# Patient Record
Sex: MALE | Race: OTHER | HISPANIC OR LATINO | URBAN - METROPOLITAN AREA
[De-identification: names, ages, dates, MRNs, and addresses within clinical notes are randomized per-mention and may not be internally consistent; named-entity substitution may affect disease eponyms.]

---

## 2024-02-07 ENCOUNTER — EMERGENCY (EMERGENCY)
Facility: HOSPITAL | Age: 29
LOS: 1 days | Discharge: HOME | End: 2024-02-07
Attending: EMERGENCY MEDICINE | Admitting: EMERGENCY MEDICINE
Payer: SELF-PAY

## 2024-02-07 VITALS
DIASTOLIC BLOOD PRESSURE: 73 MMHG | HEART RATE: 70 BPM | SYSTOLIC BLOOD PRESSURE: 121 MMHG | OXYGEN SATURATION: 97 % | RESPIRATION RATE: 17 BRPM | HEIGHT: 71 IN | WEIGHT: 210.1 LBS | TEMPERATURE: 98 F

## 2024-02-07 VITALS
RESPIRATION RATE: 18 BRPM | SYSTOLIC BLOOD PRESSURE: 153 MMHG | HEART RATE: 70 BPM | OXYGEN SATURATION: 97 % | TEMPERATURE: 98 F | DIASTOLIC BLOOD PRESSURE: 91 MMHG

## 2024-02-07 DIAGNOSIS — S00.12XA CONTUSION OF LEFT EYELID AND PERIOCULAR AREA, INITIAL ENCOUNTER: ICD-10-CM

## 2024-02-07 DIAGNOSIS — R42 DIZZINESS AND GIDDINESS: ICD-10-CM
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
## 2024-02-07 DIAGNOSIS — R51.9 HEADACHE, UNSPECIFIED: ICD-10-CM

## 2024-02-07 DIAGNOSIS — J45.909 UNSPECIFIED ASTHMA, UNCOMPLICATED: ICD-10-CM

## 2024-02-07 PROCEDURE — 99284 EMERGENCY DEPT VISIT MOD MDM: CPT

## 2024-02-07 PROCEDURE — 70486 CT MAXILLOFACIAL W/O DYE: CPT | Mod: 26,MA

## 2024-02-07 PROCEDURE — 70486 CT MAXILLOFACIAL W/O DYE: CPT | Mod: MA

## 2024-02-07 PROCEDURE — 99284 EMERGENCY DEPT VISIT MOD MDM: CPT | Mod: 25

## 2024-02-07 NOTE — ED PROVIDER NOTE - NSFOLLOWUPINSTRUCTIONS_ED_ALL_ED_FT
Please continue to ice area, Can take tylenol 650mg AND/OR motrin 600mg every 6hrs as needed for pain.  Stay well hydrated.  Follow up with primary doctor within 1-2 days.  Follow up with neurologist. Can call [phone] to schedule appointment.   Return to ER for persistent fever/vomit, uncontrolled pain, focal weakness/numbness.    Post-Concussion Syndrome    A concussion is a brain injury from a direct hit (blow) to your head or body. This blow causes your brain to shake quickly back and forth inside your skull. This can damage brain cells and cause chemical changes in your brain. Concussions are usually not life-threatening but can cause several serious symptoms.    Post-concussion syndrome is when symptoms that occur after a concussion last longer than normal. These symptoms can last from weeks to months.    What are the causes?  The cause of this condition is not known. It can happen whether your head injury was mild or severe.    What increases the risk?  You are more likely to develop this condition if:  You are female.  You are a child, teen, or young adult.  You had a past head injury.  You have a history of headaches.  You have depression or anxiety.    What are the signs or symptoms?    Physical symptoms   Headaches.  Tiredness.  Dizziness.  Weakness.  Blurry vision.  Sensitivity to light.  Hearing difficulties.  Mental and emotional symptoms     Memory difficulties.  Difficulty with concentration.  Difficulty sleeping or staying asleep.  Feeling irritable.  Anxiety or depression.  Difficulty learning new things.    How is this diagnosed?  This condition may be diagnosed based on:  Your symptoms.  A description of your injury.  Your medical history.  Your health care provider may order other tests such as:  Brain function tests (neurological testing).  CT scan.    How is this treated?  Treatment for this condition may depend on your symptoms. Symptoms usually go away on their own over time. Treatments may include:  Medicines for headaches.  Resting your brain and body for a few days after your injury.  Rehabilitation therapy, such as:  Physical or occupational therapy. This may include exercises to help with balance and dizziness.  Mental health counseling.  Speech therapy.  Vision therapy. A brain and eye specialist can recommend treatments for vision problems.    Follow these instructions at home:  Medicines     Take over-the-counter and prescription medicines only as told by your health care provider.  Avoid opioid prescription pain medicines when recovering from a concussion.  Activity     Limit your mental activities for the first few days after your injury, such as:  Homework or job-related work.  Complex thinking.  Watching TV, and using a computer or phone.  Playing memory games and puzzles.  Gradually return to your normal activity level. If a certain activity brings on your symptoms, stop or slow down until you can do the activity without it triggering your symptoms.  Limit physical activity, such as exercise or sports, for the first few days after a concussion. Gradually return to normal activity as told by your health care provider.  If a certain activity brings on your symptoms, stop or slow down until you can do the activity without it triggering your symptoms.  Rest. Rest helps your brain heal. Make sure you:  Get plenty of sleep at night. Most adults should get at least 7–9 hours of sleep each night.  Rest during the day. Take naps or rest breaks when you feel tired.  Do not do high-risk activities that could cause a second concussion, such as riding a bike or playing sports. Having another concussion before the first one has healed can be dangerous.    General instructions   Do not drink alcohol until your health care provider says you can.  Keep track of the frequency and the severity of your symptoms. Give this information to your health care provider.  Keep all follow-up visits as directed by your health care provider. This is important.  Contact a health care provider if:  Your symptoms do not improve.  You have another injury.  Get help right away if you:  Have a severe or worsening headache.  Are confused.  Have trouble staying awake.  Pass out.  Vomit.  Have weakness or numbness in any part of your body.  Have a seizure.  Have trouble speaking.  Summary  Post-concussion syndrome is when symptoms that occur after a concussion last longer than normal.  Symptoms usually go away on their own over time. Depending on your symptoms, you may need treatment, such as medicines or rehabilitation therapy.  Rest your brain and body for a few days after your injury. Gradually return to activities, as told by your health care provider.  Get plenty of sleep, and avoid alcohol and opioid pain medicines while recovering from a concussion.

## 2024-02-07 NOTE — ED ADULT NURSE NOTE - CHIEF COMPLAINT QUOTE
Pt c/o facial injury while at work- steel pipe fell 8 feet and hit L side of cheek. Pt reports wearing helmet during event, no LOC. Denies anticoagulant use, change in vision, n/v. Tetanus shot administered at [facility].

## 2024-02-07 NOTE — ED ADULT TRIAGE NOTE - CHIEF COMPLAINT QUOTE
Pt c/o facial injury while at work- steel pipe fell 8 feet and hit L side of cheek. Pt reports wearing helmet during event, no LOC. Denies anticoagulant use, change in vision, n/v. Tetanus shot administered at [facility].

## 2024-02-07 NOTE — ED PROVIDER NOTE - CLINICAL SUMMARY MEDICAL DECISION MAKING FREE TEXT BOX
28yoM with no PMH presenting to the ED after a steel pipe hit the L side of his face at work. No LOC. No n/v, HA, or blurry vision. He did not take any pain medications and is not on any blood thinners. VSS and exam notable for bruising under L eye but no focal neuro deficits or vision changes. Will obtain CT head/orbit to evaluate for fracture. 28yoM with no PMH presenting to the ED after a steel pipe hit the L side of his face at work. No LOC. No n/v, HA, or blurry vision. He did not take any pain medications and is not on any blood thinners. VSS and exam notable for bruising under L eye but no focal neuro deficits or vision changes. Will obtain CT maxillofacial to evaluate for fracture. Not currently requesting pain medications.

## 2024-02-07 NOTE — ED PROVIDER NOTE - PATIENT PORTAL LINK FT
You can access the FollowMyHealth Patient Portal offered by [facility] by registering at the following website: [email or web address]. By joining [employer]’s FollowMyHealth portal, you will also be able to view your health information using other applications (apps) compatible with our system.

## 2024-02-07 NOTE — ED ADULT NURSE NOTE - CHIEF COMPLAINT
Detail Level: Detailed Quality 337: Tuberculosis Prevention For Psoriasis And Psoriatic Arthritis Patients On A Biological Immune Response Modifier: Patient has a documented negative annual TB screening. Quality 410: Psoriasis Clinical Response To Oral Systemic Or Biologic Medications: Psoriasis Assessment Tool Documented, Met Specified Benchmark The patient is a 28y Male complaining of facial pain.

## 2024-02-07 NOTE — ED PROVIDER NOTE - DIFFERENTIAL DIAGNOSIS
Differential Diagnosis Highest on differential is concussion after steel pipe hit patient's L side of face at work as a . Do not miss diagnosis includes fracture of orbit, will obtain CT orbit/head to evaluate. Not on blood thinners. Highest on differential is concussion after steel pipe hit patient's L side of face at work as a . Do not miss diagnosis includes fracture of orbit, will obtain CT maxillofacial to evaluate. Not on blood thinners.

## 2024-02-07 NOTE — ED PROVIDER NOTE - OBJECTIVE STATEMENT
28yoM with no PMH presenting to the ED after a steel pipe hit the L side of his face at work. He works as an  and today at work a steel pipe fell from 8 feet and hit his face. After he hit his face, he was stunned and shocked but no LOC. No n/v, HA, or blurry vision. He did not take any pain medications and is not on any blood thinners.

## 2024-02-07 NOTE — ED PROVIDER NOTE - PHYSICAL EXAMINATION
HEENT: bruising under L eye, EOMI, PERRL, no laceration  Neuro: no focal deficit General: alert, oriented, talking without difficulty, no acute distress  HEENT: bruising under L eye, EOMI, PERRL, no lacerations, no blood behind TM  Neuro: no focal deficit

## 2024-02-07 NOTE — ED ADULT NURSE NOTE - OBJECTIVE STATEMENT
Pt A&Ox4 presents to ED with complaints of left facial injury/pain. Pt reports "a 40 pound steel pipe fell 8 feet and hit me in the face at work." Pt reports "I felt out of it, but did not lose consciousness." Pt went to [facility] who administered Tetanus shot to him and referred him to ED for CT. Pt has visible swelling and bruising to left cheek. PERRL. Full strength and sensation in all four extremities. Pt denies changes in vision, numbness/tingling, nausea/vomiting. Ice pack provided. Denies use of blood thinners.

## 2024-02-07 NOTE — ED PROVIDER NOTE - NS ED ATTENDING STATEMENT MOD
I have seen and examined this patient and fully participated in the care of this patient as the teaching attending.  The service was shared with the [facility].  I reviewed and verified the documentation.

## 2024-02-07 NOTE — ED PROVIDER NOTE - NS ED ROS FT
Pain on L side of face.   Dizziness after pole hit face.     No nausea, vomiting, HA, blurry vision, pain with eye movement

## 2024-02-07 NOTE — ED PROVIDER NOTE - ATTENDING CONTRIBUTION TO CARE
29 yo PMHx of asthma with L sided facial pain after steel pipe from 8 feet high hit pt on L side of face, no loc, felt stunned. Dizzy but no vomiting, Denies headache, blurry vision. No nausea or vomiting. Denies hitting head,  vision changes, focal weakness/numbness, neck/back pain, Not on AC. Well appearing, nad, nc/at, lung cta, heart reg, abd soft, nt, ext no gross deformity, no gross neuro deficits, EOMI, bruising over L maxilla, will obtain CT maxillofacial to eval for underlying fx/injury, [place] headCT rule used in decision making for head CT, also no direct impact to head.